# Patient Record
Sex: MALE | Race: WHITE
[De-identification: names, ages, dates, MRNs, and addresses within clinical notes are randomized per-mention and may not be internally consistent; named-entity substitution may affect disease eponyms.]

---

## 2018-09-16 ENCOUNTER — HOSPITAL ENCOUNTER (INPATIENT)
Dept: HOSPITAL 96 - M.ERS | Age: 68
LOS: 2 days | Discharge: HOME | DRG: 69 | End: 2018-09-18
Attending: INTERNAL MEDICINE | Admitting: INTERNAL MEDICINE
Payer: COMMERCIAL

## 2018-09-16 VITALS — BODY MASS INDEX: 24.4 KG/M2 | HEIGHT: 67.99 IN | WEIGHT: 161 LBS

## 2018-09-16 VITALS — DIASTOLIC BLOOD PRESSURE: 98 MMHG | SYSTOLIC BLOOD PRESSURE: 193 MMHG

## 2018-09-16 VITALS — DIASTOLIC BLOOD PRESSURE: 94 MMHG | SYSTOLIC BLOOD PRESSURE: 165 MMHG

## 2018-09-16 VITALS — SYSTOLIC BLOOD PRESSURE: 149 MMHG | DIASTOLIC BLOOD PRESSURE: 77 MMHG

## 2018-09-16 DIAGNOSIS — I16.1: ICD-10-CM

## 2018-09-16 DIAGNOSIS — Z87.891: ICD-10-CM

## 2018-09-16 DIAGNOSIS — Z79.899: ICD-10-CM

## 2018-09-16 DIAGNOSIS — F41.9: ICD-10-CM

## 2018-09-16 DIAGNOSIS — E86.0: ICD-10-CM

## 2018-09-16 DIAGNOSIS — Z79.82: ICD-10-CM

## 2018-09-16 DIAGNOSIS — I16.0: ICD-10-CM

## 2018-09-16 DIAGNOSIS — G45.9: Primary | ICD-10-CM

## 2018-09-16 LAB
ABSOLUTE BASOPHILS: 0 THOU/UL (ref 0–0.2)
ABSOLUTE EOSINOPHILS: 0.1 THOU/UL (ref 0–0.7)
ABSOLUTE MONOCYTES: 0.8 THOU/UL (ref 0–1.2)
ALBUMIN SERPL-MCNC: 3.8 G/DL (ref 3.4–5)
ALP SERPL-CCNC: 91 U/L (ref 46–116)
ALT SERPL-CCNC: 35 U/L (ref 30–65)
ANION GAP SERPL CALC-SCNC: 11 MMOL/L (ref 7–16)
APTT BLD: 28.4 SECONDS (ref 25–31.3)
AST SERPL-CCNC: 37 U/L (ref 15–37)
BACTERIA-REFLEX: (no result) /HPF
BASOPHILS NFR BLD AUTO: 0.5 %
BILIRUB SERPL-MCNC: 0.8 MG/DL
BILIRUB UR-MCNC: NEGATIVE MG/DL
BUN SERPL-MCNC: 11 MG/DL (ref 7–18)
CALCIUM SERPL-MCNC: 8 MG/DL (ref 8.5–10.1)
CHLORIDE SERPL-SCNC: 102 MMOL/L (ref 98–107)
CO2 SERPL-SCNC: 25 MMOL/L (ref 21–32)
COLOR UR: YELLOW
CREAT SERPL-MCNC: 1 MG/DL (ref 0.6–1.3)
EOSINOPHIL NFR BLD: 1.4 %
GLUCOSE SERPL-MCNC: 87 MG/DL (ref 70–99)
GRANULOCYTES NFR BLD MANUAL: 71.6 %
HCT VFR BLD CALC: 49.2 % (ref 42–52)
HGB BLD-MCNC: 16.6 GM/DL (ref 14–18)
INR PPP: 1
KETONES UR STRIP-MCNC: (no result) MG/DL
LIPASE: 179 U/L (ref 73–393)
LYMPHOCYTES # BLD: 1.2 THOU/UL (ref 0.8–5.3)
LYMPHOCYTES NFR BLD AUTO: 16.4 %
MCH RBC QN AUTO: 27.5 PG (ref 26–34)
MCHC RBC AUTO-ENTMCNC: 33.7 G/DL (ref 28–37)
MCV RBC: 81.6 FL (ref 80–100)
MONOCYTES NFR BLD: 10.1 %
MPV: 6.7 FL. (ref 7.2–11.1)
MUCUS: (no result) STRN/LPF
NEUTROPHILS # BLD: 5.3 THOU/UL (ref 1.6–8.1)
NT-PRO BRAIN NAT PEPTIDE: 466 PG/ML (ref ?–300)
NUCLEATED RBCS: 0 /100WBC
PLATELET COUNT*: 339 THOU/UL (ref 150–400)
POTASSIUM SERPL-SCNC: 3.7 MMOL/L (ref 3.5–5.1)
PROT SERPL-MCNC: 7.7 G/DL (ref 6.4–8.2)
PROT UR QL STRIP: NEGATIVE
PROTHROMBIN TIME: 10.4 SECONDS (ref 9.2–11.5)
RBC # BLD AUTO: 6.03 MIL/UL (ref 4.5–6)
RBC # UR STRIP: NEGATIVE /UL
RBC #/AREA URNS HPF: (no result) /HPF (ref 0–2)
RDW-CV: 14 % (ref 10.5–14.5)
SODIUM SERPL-SCNC: 138 MMOL/L (ref 136–145)
SP GR UR STRIP: 1.01 (ref 1–1.03)
SQUAMOUS: (no result) /LPF (ref 0–3)
TROPONIN-I LEVEL: <0.06 NG/ML (ref ?–0.06)
URINE CLARITY: CLEAR
URINE GLUCOSE-RANDOM: NEGATIVE
URINE LEUKOCYTES-REFLEX: (no result)
URINE NITRITE-REFLEX: NEGATIVE
URINE WBC-REFLEX: (no result) /HPF (ref 0–5)
UROBILINOGEN UR STRIP-ACNC: 0.2 E.U./DL (ref 0.2–1)
WBC # BLD AUTO: 7.5 THOU/UL (ref 4–11)

## 2018-09-17 VITALS — SYSTOLIC BLOOD PRESSURE: 151 MMHG | DIASTOLIC BLOOD PRESSURE: 64 MMHG

## 2018-09-17 VITALS — SYSTOLIC BLOOD PRESSURE: 119 MMHG | DIASTOLIC BLOOD PRESSURE: 63 MMHG

## 2018-09-17 VITALS — SYSTOLIC BLOOD PRESSURE: 155 MMHG | DIASTOLIC BLOOD PRESSURE: 71 MMHG

## 2018-09-17 VITALS — SYSTOLIC BLOOD PRESSURE: 118 MMHG | DIASTOLIC BLOOD PRESSURE: 60 MMHG

## 2018-09-17 VITALS — SYSTOLIC BLOOD PRESSURE: 138 MMHG | DIASTOLIC BLOOD PRESSURE: 67 MMHG

## 2018-09-17 NOTE — EKG
[place] [address]
Phone:  [phone]                     ELECTROCARDIOGRAM REPORT      
_______________________________________________________________________________
 
Name:       ECTOR LIM                  Room:           [address] [place]  
[facility]#:  [id number]      Account #:      [id number]  
Admission:  18     Attend Phys:    REMI Izquierdo
Discharge:               Date of Birth:  [birth year]  
         Report #: [id number]
    [id number]
_______________________________________________________________________________
THIS REPORT FOR:  //name//                      
 
                         [facility] ED
                                       
Test Date:    2018               Test Time:    14:37:58
Pat Name:     ECTOR LIM              Department:   
Patient ID:   [id number]            Room:         [facility]
Gender:       M                        Technician:   
:          1950               Requested By: Monica Mc
Order Number: [id number] MD:   Maverick Joseph
                                 Measurements
Intervals                              Axis          
Rate:         65                       P:            30
HI:           137                      QRS:          31
QRSD:         98                       T:            46
QT:           419                                    
QTc:          436                                    
                           Interpretive Statements
Sinus rhythm
Probable left ventricular hypertrophy with repolarization changes
Compared to ECG 2016 10:12:50
No significant changes
 
Electronically Signed On 2018 13:00:49 CDT by Maverick Joseph
[email or web address]
 
 
 
 
 
 
 
 
 
 
 
 
 
 
 
 
 
 
  <ELECTRONICALLY SIGNED>
                                           By: Maverick Joseph MD, FAC      
  18     1300
D: 18 1437   _____________________________________
T: 18 1437   Maverick Joseph MD, [facility]        /EPI

## 2018-09-18 VITALS — SYSTOLIC BLOOD PRESSURE: 145 MMHG | DIASTOLIC BLOOD PRESSURE: 76 MMHG

## 2018-09-18 VITALS — SYSTOLIC BLOOD PRESSURE: 148 MMHG | DIASTOLIC BLOOD PRESSURE: 81 MMHG

## 2018-09-18 VITALS — DIASTOLIC BLOOD PRESSURE: 71 MMHG | SYSTOLIC BLOOD PRESSURE: 142 MMHG

## 2018-09-18 VITALS — DIASTOLIC BLOOD PRESSURE: 78 MMHG | SYSTOLIC BLOOD PRESSURE: 135 MMHG

## 2018-09-18 LAB
ALBUMIN SERPL-MCNC: 3.2 G/DL (ref 3.4–5)
ALP SERPL-CCNC: 77 U/L (ref 46–116)
ALT SERPL-CCNC: 25 U/L (ref 30–65)
ANION GAP SERPL CALC-SCNC: 3 MMOL/L (ref 7–16)
AST SERPL-CCNC: 20 U/L (ref 15–37)
BILIRUB SERPL-MCNC: 0.3 MG/DL
BUN SERPL-MCNC: 13 MG/DL (ref 7–18)
CALCIUM SERPL-MCNC: 8.1 MG/DL (ref 8.5–10.1)
CHLORIDE SERPL-SCNC: 105 MMOL/L (ref 98–107)
CHOLEST SERPL-MCNC: 192 MG/DL (ref ?–200)
CO2 SERPL-SCNC: 30 MMOL/L (ref 21–32)
CREAT SERPL-MCNC: 0.9 MG/DL (ref 0.6–1.3)
GLUCOSE SERPL-MCNC: 82 MG/DL (ref 70–99)
HDLC SERPL-MCNC: 46 MG/DL (ref 40–?)
LDLC SERPL-MCNC: 96 MG/DL (ref ?–100)
POTASSIUM SERPL-SCNC: 4.1 MMOL/L (ref 3.5–5.1)
PROT SERPL-MCNC: 6.5 G/DL (ref 6.4–8.2)
SODIUM SERPL-SCNC: 138 MMOL/L (ref 136–145)
TC:HDL: 4.2 RATIO
TRIGL SERPL-MCNC: 250 MG/DL (ref ?–150)
VLDLC SERPL CALC-MCNC: 50 MG/DL (ref ?–40)

## 2018-09-18 NOTE — 2DMMODE
[place] [address]
Phone:  [phone] 2 D/M-MODE ECHOCARDIOGRAM     
_______________________________________________________________________________
 
Name:         ANNA LIM ANGELICA           Room:          [address] [place] 
[facility]#:    [id number]     Account #:     [id number]  
Admission:    18    Attend Phys:   Pedro Estrada
Discharge:    18    Date of Birth: [birth year]  
Date of Service: 18 1705  Report #:      [id number]
        [id number]
_______________________________________________________________________________
THIS REPORT FOR:  //name//                      
 
 
--------------- APPROVED REPORT --------------
 
 
Study performed:  2018 14:08:46
 
EXAM: Comprehensive 2D, Doppler, and color-flow 
Echocardiogram 
Patient Location: In-Patient   
Room #:  229     Status:  routine
 
      BSA:         1.86
HR: 60 bpm BP:          142/71 mmHg 
Rhythm: NSR     
 
Other Information 
Study Quality: Good
 
Indications
Possible TIA
 
Echo Enhancing Agent
Indication: Rule out Shunt
Agent(s) / Amount(s) Used: Agitated Saline 10 cc
 
2D Dimensions
IVSd:  14.48 (7-11mm) LVOT Diam:  19.33 (18-24mm) 
LVDd:  51.07 mm  
PWd:  11.28 (7-11mm) Ascending Ao:  34.97 (22-36mm)
LVDs:  27.46 (25-40mm) 
Aortic Root:  31.21 mm 
 
Volumes
Left Atrial Volume (Systole) 
    LA ESV Index:  32.00 mL/m2
 
Aortic Valve
AoV Peak Ochoa.:  2.92 m/s 
AO Peak Gr.:  34.13 mmHg LVOT Max P.60 mmHg
AO Mean Gr.:  17.58 mmHg LVOT Mean P.12 mmHg
    LVOT Max V:  2.58 m/s
AO V2 VTI:  45.88 cm  LVOT Mean V:  1.49 m/s
STEPHANIE (VTI):  2.57 cm2  LVOT V1 VTI:  40.22 cm
AI Moca:  1.56 m/s2  
 
 
[place] [address]
Phone:  [phone]                     2 D/M-MODE ECHOCARDIOGRAM     
_______________________________________________________________________________
 
Name:         LIMANNA           Room:          [address]#:    [id number]     Account #:     [id number]  
Admission:    18    Attend Phys:   Pedro Estrada
Discharge:    18    Date of Birth: [birth year]  
Date of Service: 18 1705  Report #:      [id number]
        [id number]
_______________________________________________________________________________
AI PHT:  725.09 ms  
 
Mitral Valve
    E/A Ratio:  1.05
    MV Decel. Time:  149.18 ms
MV E Max Ochoa.:  0.81 m/s 
MV PHT:  43.26 ms  
MVA (PHT):  5.09 cm2  
 
TDI
E/Lateral E':  5.79 E/Medial E':  9.00
   Medial E' Ochoa.:  0.09 m/s
   Lateral E' Ochoa.:  0.14 m/s
 
Pulmonary Valve
PV Peak Ochoa.:  1.44 m/s PV Peak Gr.:  8.32 mmHg
 
Left Ventricle
The left ventricle is normal size. There is normal LV segmental wall 
motion. Left ventricular outflow tract gradient is present. Left 
ventricular systolic function is normal. The left ventricular 
ejection fraction is within the normal range. LVEF is 65-70%. The 
left ventricular diastolic function is normal.
 
Right Ventricle
The right ventricle is normal size. The right ventricular systolic 
function is normal.
 
Atria
The left atrium size is normal. Interatrial septum is intact without 
evidence of ASD or PFO.Negative bubble study. The right atrium size 
is normal.
 
Aortic Valve
Moderate aortic valve sclerosis. Aortic valve is not well visualized. 
Mild aortic regurgitation. Mild aortic stenosis.
 
Mitral Valve
The mitral valve is normal in structure. There is no mitral valve 
regurgitation noted. No evidence of mitral valve stenosis.
 
Tricuspid Valve
The tricuspid valve is normal in structure. Unable to assess PA 
pressure. Trace tricuspid regurgitation.
 
Pulmonic Valve
 
 
[place] [address]
Phone:  [phone]                     2 D/M-MODE ECHOCARDIOGRAM     
_______________________________________________________________________________
 
Name:         LIMANNA ANGELICA           Room:          [address] [place] 
M.R.#:    [id number]     Account #:     [id number]  
Admission:    18    Attend Phys:   Pedro Estrada
Discharge:    18    Date of Birth: [birth year]  
Date of Service: 18 1705  Report #:      [id number]
        [id number]
_______________________________________________________________________________
The pulmonary valve is normal in structure. There is no pulmonic 
valvular regurgitation.
 
Great Vessels
The aortic root is normal in size. IVC is normal in size and 
collapses &gt;50% with inspiration.
 
Pericardium
There is no pericardial effusion.
 
&lt;Conclusion&gt;
LVEF is 65-70%.
There is normal LV segmental wall motion.
Mild aortic stenosis.
Mild aortic regurgitation.
Interatrial septum is intact without evidence of ASD or PFO.Negative 
bubble study.
 
 
 
 
 
 
 
 
 
 
 
 
 
 
 
 
 
 
 
 
 
 
 
 
 
 
 
  <ELECTRONICALLY SIGNED>
                                           By: Francisco Eldridge MD, FACC   
  18
D: 18   _____________________________________
T: 18   Francisco Eldridge MD, FACC     /INF

## 2018-09-19 LAB
EST. AVERAGE GLUCOSE BLD GHB EST-MCNC: 108 MG/DL
GLYCOHEMOGLOBIN (HGB A1C): 5.4 % (ref 4.8–5.6)

## 2018-09-21 NOTE — CON
[facility] 
[address] [place]  [address]                    CONSULTATION                  
_______________________________________________________________________________
 
Name:       RAPHAELANNA DOMINGUEZ            Room:           [address] [place]  
M.R.#:  [id number]      Account #:      [id number]  
Admission:  09/16/18     Attend Phys:    REMI Izquierdo
Discharge:  09/18/18     Date of Birth:  [birth year]  
         Report #: [id number]
                                                                     [id number]  
_______________________________________________________________________________
THIS REPORT FOR:  //name//                      
 
CC: Gal Estrada
 
DATE OF SERVICE:  09/17/2018
 
 
HISTORY OF PRESENT ILLNESS:  This is a 68-year-old male patient who is not able
to provide any reliable history.  I called the patient's wife, but I was unable
to reach her.  This patient's records were reviewed and I talked to him in
detail.  He does not think there is much wrong with him.  He said he had some
weakness, but that was not very pronounced.  He does not think he is confused. 
His wife has given a different history that he has been under a lot of stress
and he is near a nervous breakdown.  He does not take any medication.  He has
intermittent confusion, but he denies that.  That is all the history I can get
in this patient.
 
REVIEW OF SYSTEMS:  Positive for hypertension.  When he was admitted his blood
pressure was high, but systolic was more high, then diastolic.  He denies any
prior history of stroke.  He is saying that he is back to his normal self and he
does not have any new eye, ENT, cardiac, respiratory, GI, , constitutional,
dermatological, hematological, psychiatric, throat, allergic symptom associated
with present symptomatology.
 
PAST MEDICAL HISTORY:  Negative for stroke.
 
FAMILY HISTORY:  Negative for early age stroke.
 
SOCIAL HISTORY:  He is , but I am not able to reach his wife.  He says he
does not smoke or drink any alcohol.
 
PHYSICAL EXAMINATION:  Indicate that he is alert.  He is responsive.  He can
follow simple commands.  His speech, concentration, fund of knowledge and memory
is at his baseline.  Cranial nerve examination 2-12 looks mostly unremarkable. 
His strength, sensation, reflexes and tone is symmetrical.  I could not look at
the fundus.  He does not have any cerebellar sign the best I can tell.  He is a
well-built individual who does not have any dysmorphic features of eyes, ears
and face.  His vision and hearing looks adequate.  His pulses are palpable.  He
has no edema, cyanosis or jaundice.  Cardiac examination is unremarkable.  No
respiratory difficulty or rhonchi was noticed on either side.  Blood pressure
now is 151/64, respiration is 18, pulse of 58, temperature is 98.
 
LABORATORY DATA:  Indicate a white count of 7.5.  Sodium is 138.  He did have a
CT scan of the head and neck that was unremarkable.  He says that there is no
contraindication for MRI and I tried to reach his wife, but I was unable to
 
 
 
[place] [address]                    CONSULTATION                  
_______________________________________________________________________________
 
Name:       ANNA LIM            Room:           [address]#:  [id number]      Account #:      [id number]  
Admission:  09/16/18     Attend Phys:    REMI Izquierdo
Discharge:  09/18/18     Date of Birth:  [birth year]  
         Report #: [id number]
                                                                     [id number]  
_______________________________________________________________________________
reach her.
 
IMPRESSION:  Episodes of confusion for which the etiology is not clear.  I
cannot reach his wife and I think it would be desirable to do an MRI of the
brain.  Since he is having episode of confusion, I will also do an EEG, but
bigger thing is making sure that he does not have any psychiatric issues, which
need to be addressed.  For that, I will suggest a psychiatric consult.
 
RECOMMENDATIONS:
1.  MRI of the brain.
2.  EEG.
3.  I will suggest a psychiatric consult.
4.  We will try to continue to reach the patient's wife.
 
 
 
 
 
 
 
 
 
 
 
 
 
 
 
 
 
 
 
 
 
 
 
 
 
 
 
 
 
 
 
<ELECTRONICALLY SIGNED>
                                        By:  Anish Viera MD             
09/21/18     0932
D: 09/17/18 1434_______________________________________
T: 09/17/18 2034Pelena Viera MD                /nt

## 2018-09-21 NOTE — EEG
[address]  [address]                    EEG STUDY REPORT              
_______________________________________________________________________________
 
Name:       ANNA LIM            Room:           [address] [place]  
M.R.#:  [id number]      Account #:      [id number]  
Admission:  09/16/18     Attend Phys:    REMI Izquierdo
Discharge:  09/18/18     Date of Birth:  [birth year]  
         Report #: [id number]
                                                                     [id number]  
_______________________________________________________________________________
THIS REPORT FOR:  //name//                      
 
CC: Gal Estrada
 
DATE OF SERVICE:  09/17/2018
 
 
This patient is being evaluated for altered mental status.  EEG was done by
placing the electrodes by standard 10-20 system of electrode placement.  Both
referential and sequential montages were used for recording.  Background
activity in this patient's EEG is about 9-10 Hz and 30 microvolt.  The patient
went to sleep that is associated with bilaterally symmetrical sleep spindle and
vertex sharp waves.  Throughout the record, no active epileptiform activity was
noticed.
 
IMPRESSION:  This patient's EEG is within normal limits.
 
Thank you very much for this referral.
 
 
 
 
 
 
 
 
 
 
 
 
 
 
 
 
 
 
 
 
 
 
 
 
 
<ELECTRONICALLY SIGNED>
                                        By:  Anish Viera MD             
09/21/18     0932
D: 09/18/18 1331_______________________________________
T: 09/18/18 1409Anish Viera MD                /nt